# Patient Record
Sex: FEMALE | Race: OTHER | URBAN - METROPOLITAN AREA
[De-identification: names, ages, dates, MRNs, and addresses within clinical notes are randomized per-mention and may not be internally consistent; named-entity substitution may affect disease eponyms.]

---

## 2022-10-08 ENCOUNTER — EMERGENCY (EMERGENCY)
Facility: HOSPITAL | Age: 32
LOS: 0 days | Discharge: HOME | End: 2022-10-08
Attending: STUDENT IN AN ORGANIZED HEALTH CARE EDUCATION/TRAINING PROGRAM

## 2022-10-08 VITALS
RESPIRATION RATE: 16 BRPM | SYSTOLIC BLOOD PRESSURE: 112 MMHG | OXYGEN SATURATION: 96 % | DIASTOLIC BLOOD PRESSURE: 80 MMHG | HEART RATE: 85 BPM | TEMPERATURE: 98 F

## 2022-10-08 VITALS
HEART RATE: 107 BPM | RESPIRATION RATE: 17 BRPM | SYSTOLIC BLOOD PRESSURE: 118 MMHG | WEIGHT: 151.9 LBS | TEMPERATURE: 99 F | OXYGEN SATURATION: 100 % | DIASTOLIC BLOOD PRESSURE: 80 MMHG

## 2022-10-08 DIAGNOSIS — M25.561 PAIN IN RIGHT KNEE: ICD-10-CM

## 2022-10-08 PROCEDURE — 99283 EMERGENCY DEPT VISIT LOW MDM: CPT

## 2022-10-08 PROCEDURE — 73562 X-RAY EXAM OF KNEE 3: CPT | Mod: 26,RT

## 2022-10-08 RX ORDER — ACETAMINOPHEN 500 MG
650 TABLET ORAL ONCE
Refills: 0 | Status: COMPLETED | OUTPATIENT
Start: 2022-10-08 | End: 2022-10-08

## 2022-10-08 NOTE — ED ADULT NURSE NOTE - NSIMPLEMENTINTERV_GEN_ALL_ED
Implemented All Fall Risk Interventions:  [place] to call system. Call bell, personal items and telephone within reach. Instruct patient to call for assistance. Room bathroom lighting operational. Non-slip footwear when patient is off stretcher. Physically safe environment: no spills, clutter or unnecessary equipment. Stretcher in lowest position, wheels locked, appropriate side rails in place. Provide visual cue, wrist band, yellow gown, etc. Monitor gait and stability. Monitor for mental status changes and reorient to person, place, and time. Review medications for side effects contributing to fall risk. Reinforce activity limits and safety measures with patient and family.

## 2022-10-08 NOTE — ED PROVIDER NOTE - WR ORDER ID 1
[id number] Podiatry:  Please follow up with Dr. Shha within 1 week of discharge from the hospital, please call [phone] for appointment and discuss that you recently were seen in the hospital.

## 2022-10-08 NOTE — ED PROVIDER NOTE - PHYSICAL EXAMINATION
VITAL SIGNS: I have reviewed nursing notes and confirm.  CONSTITUTIONAL: well-appearing, non-toxic, NAD  SKIN: Warm dry, normal skin turgor  HEAD: NCAT  EYES: EOMI, PERRLA, no scleral icterus  ENT: Moist mucous membranes, normal pharynx with no erythema or exudates  NECK: Supple; non tender. Full ROM. No cervical LAD  CARD: RRR, no murmurs, rubs or gallops  RESP: clear to ausculation b/l.  No rales, rhonchi, or wheezing.  ABD: soft, + BS, non-tender, non-distended, no rebound or guarding. No CVA tenderness  EXT: R knee tenderness, no noted swelling or redness FROM knee, knee active or passive FROM.   NEURO: normal motor. normal sensory. CN II-XII intact. Cerebellar testing normal. Normal gait.  PSYCH: Cooperative, appropriate.

## 2022-10-08 NOTE — ED PROVIDER NOTE - CLINICAL SUMMARY MEDICAL DECISION MAKING FREE TEXT BOX
Pt with chronic R knee pain x3 days, will obtain R knee X-ray will follow up with orthopedic for pain management.

## 2022-10-08 NOTE — ED PROVIDER NOTE - NS ED ROS FT
Constitutional: See HPI.  Eyes: No visual changes, eye pain or discharge. No Photophobia  ENMT: No hearing changes, pain, discharge or infections. No neck pain or stiffness. No limited ROM  Cardiac: No SOB or edema. No chest pain with exertion.  Respiratory: No cough or respiratory distress. No hemoptysis. No history of asthma or RAD.  GI: No nausea, vomiting, diarrhea or abdominal pain.  : No dysuria, frequency or burning. No Discharge  MS: No myalgia, muscle weakness, joint pain or back pain. +R knee pain  Neuro: No headache or weakness. No LOC.  Skin: No skin rash.  Except as documented in the HPI, all other systems are negative.

## 2022-10-08 NOTE — ED ADULT NURSE NOTE - OBJECTIVE STATEMENT
as per patient " hx of low C/D levels, complains of right knee pain, swelling and decrease mobility x 3 months, symptoms worsening for the past 3 days" [ Denies trauma to knee]

## 2022-10-08 NOTE — ED ADULT TRIAGE NOTE - CHIEF COMPLAINT QUOTE
as per patient c/o worsening R knee pain and swelling over 3 days. States symptoms started start 3 months prior, only started worsening recently.   denies injury or trauma.

## 2022-10-08 NOTE — ED PROVIDER NOTE - CARE PROVIDER_API CALL
Jer Barrett)  Orthopaedic Surgery  [address], [address]  [place] [address]  Phone: [phone]  Fax: [phone]  Follow Up Time: 4-6 Days

## 2022-10-08 NOTE — ED PROVIDER NOTE - PATIENT PORTAL LINK FT
You can access the FollowMyHealth Patient Portal offered by [facility] by registering at the following website: [email or web address]. By joining [employer]’s FollowMyHealth portal, you will also be able to view your health information using other applications (apps) compatible with our system.

## 2022-10-08 NOTE — ED PROVIDER NOTE - OBJECTIVE STATEMENT
31 y/o F with no significant PMHx presents to the ED for R knee pain worsening x3 days. Pt denies any trauma. Pt is able to walk with steady gate. Pt is ambulating. Pt denies f/c. Pt has no other complaints.

## 2022-10-08 NOTE — ED PROVIDER NOTE - NSFOLLOWUPINSTRUCTIONS_ED_ALL_ED_FT

## 2023-07-01 ENCOUNTER — EMERGENCY (EMERGENCY)
Facility: HOSPITAL | Age: 33
LOS: 0 days | Discharge: HOME | End: 2023-07-01
Attending: STUDENT IN AN ORGANIZED HEALTH CARE EDUCATION/TRAINING PROGRAM
Payer: COMMERCIAL

## 2023-07-01 VITALS
HEART RATE: 81 BPM | RESPIRATION RATE: 16 BRPM | OXYGEN SATURATION: 98 % | SYSTOLIC BLOOD PRESSURE: 131 MMHG | WEIGHT: 156.09 LBS | TEMPERATURE: 98 F | HEIGHT: 65 IN | DIASTOLIC BLOOD PRESSURE: 68 MMHG

## 2023-07-01 VITALS
SYSTOLIC BLOOD PRESSURE: 121 MMHG | RESPIRATION RATE: 16 BRPM | DIASTOLIC BLOOD PRESSURE: 67 MMHG | HEART RATE: 88 BPM | OXYGEN SATURATION: 98 % | TEMPERATURE: 99 F

## 2023-07-01 DIAGNOSIS — R30.0 DYSURIA: ICD-10-CM

## 2023-07-01 DIAGNOSIS — R10.9 UNSPECIFIED ABDOMINAL PAIN: ICD-10-CM

## 2023-07-01 DIAGNOSIS — N90.7 VULVAR CYST: ICD-10-CM

## 2023-07-01 LAB
ALBUMIN SERPL ELPH-MCNC: 3.5 G/DL — SIGNIFICANT CHANGE UP (ref 3.3–5)
ALP SERPL-CCNC: 71 U/L — SIGNIFICANT CHANGE UP (ref 40–120)
ALT FLD-CCNC: 22 U/L — SIGNIFICANT CHANGE UP (ref 12–78)
ANION GAP SERPL CALC-SCNC: 0 MMOL/L — LOW (ref 5–17)
APPEARANCE UR: CLEAR — SIGNIFICANT CHANGE UP
AST SERPL-CCNC: 18 U/L — SIGNIFICANT CHANGE UP (ref 15–37)
BASOPHILS # BLD AUTO: 0.07 K/UL — SIGNIFICANT CHANGE UP (ref 0–0.2)
BASOPHILS NFR BLD AUTO: 0.9 % — SIGNIFICANT CHANGE UP (ref 0–2)
BILIRUB SERPL-MCNC: 0.3 MG/DL — SIGNIFICANT CHANGE UP (ref 0.2–1.2)
BILIRUB UR-MCNC: NEGATIVE — SIGNIFICANT CHANGE UP
BUN SERPL-MCNC: 7 MG/DL — SIGNIFICANT CHANGE UP (ref 7–23)
CALCIUM SERPL-MCNC: 9 MG/DL — SIGNIFICANT CHANGE UP (ref 8.5–10.1)
CHLORIDE SERPL-SCNC: 109 MMOL/L — HIGH (ref 96–108)
CO2 SERPL-SCNC: 29 MMOL/L — SIGNIFICANT CHANGE UP (ref 22–31)
COLOR SPEC: YELLOW — SIGNIFICANT CHANGE UP
CREAT SERPL-MCNC: 0.58 MG/DL — SIGNIFICANT CHANGE UP (ref 0.5–1.3)
DIFF PNL FLD: NEGATIVE — SIGNIFICANT CHANGE UP
EGFR: 121 ML/MIN/1.73M2 — SIGNIFICANT CHANGE UP
EOSINOPHIL # BLD AUTO: 0.29 K/UL — SIGNIFICANT CHANGE UP (ref 0–0.5)
EOSINOPHIL NFR BLD AUTO: 3.5 % — SIGNIFICANT CHANGE UP (ref 0–6)
GLUCOSE SERPL-MCNC: 98 MG/DL — SIGNIFICANT CHANGE UP (ref 70–99)
GLUCOSE UR QL: NEGATIVE MG/DL — SIGNIFICANT CHANGE UP
HCG SERPL-ACNC: <1 MIU/ML — SIGNIFICANT CHANGE UP
HCT VFR BLD CALC: 36.1 % — SIGNIFICANT CHANGE UP (ref 34.5–45)
HGB BLD-MCNC: 11.5 G/DL — SIGNIFICANT CHANGE UP (ref 11.5–15.5)
IMM GRANULOCYTES NFR BLD AUTO: 0.5 % — SIGNIFICANT CHANGE UP (ref 0–0.9)
KETONES UR-MCNC: NEGATIVE — SIGNIFICANT CHANGE UP
LEUKOCYTE ESTERASE UR-ACNC: NEGATIVE — SIGNIFICANT CHANGE UP
LYMPHOCYTES # BLD AUTO: 2.07 K/UL — SIGNIFICANT CHANGE UP (ref 1–3.3)
LYMPHOCYTES # BLD AUTO: 25.2 % — SIGNIFICANT CHANGE UP (ref 13–44)
MCHC RBC-ENTMCNC: 26.3 PG — LOW (ref 27–34)
MCHC RBC-ENTMCNC: 31.9 G/DL — LOW (ref 32–36)
MCV RBC AUTO: 82.6 FL — SIGNIFICANT CHANGE UP (ref 80–100)
MONOCYTES # BLD AUTO: 0.52 K/UL — SIGNIFICANT CHANGE UP (ref 0–0.9)
MONOCYTES NFR BLD AUTO: 6.3 % — SIGNIFICANT CHANGE UP (ref 2–14)
NEUTROPHILS # BLD AUTO: 5.22 K/UL — SIGNIFICANT CHANGE UP (ref 1.8–7.4)
NEUTROPHILS NFR BLD AUTO: 63.6 % — SIGNIFICANT CHANGE UP (ref 43–77)
NITRITE UR-MCNC: NEGATIVE — SIGNIFICANT CHANGE UP
NRBC # BLD: 0 /100 WBCS — SIGNIFICANT CHANGE UP (ref 0–0)
PH UR: 7 — SIGNIFICANT CHANGE UP (ref 5–8)
PLATELET # BLD AUTO: 321 K/UL — SIGNIFICANT CHANGE UP (ref 150–400)
POTASSIUM SERPL-MCNC: 3.8 MMOL/L — SIGNIFICANT CHANGE UP (ref 3.5–5.3)
POTASSIUM SERPL-SCNC: 3.8 MMOL/L — SIGNIFICANT CHANGE UP (ref 3.5–5.3)
PROT SERPL-MCNC: 7.3 GM/DL — SIGNIFICANT CHANGE UP (ref 6–8.3)
PROT UR-MCNC: NEGATIVE MG/DL — SIGNIFICANT CHANGE UP
RBC # BLD: 4.37 M/UL — SIGNIFICANT CHANGE UP (ref 3.8–5.2)
RBC # FLD: 13.3 % — SIGNIFICANT CHANGE UP (ref 10.3–14.5)
SODIUM SERPL-SCNC: 138 MMOL/L — SIGNIFICANT CHANGE UP (ref 135–145)
SP GR SPEC: 1 — LOW (ref 1.01–1.02)
TSH SERPL-MCNC: 4.42 UIU/ML — HIGH (ref 0.36–3.74)
UROBILINOGEN FLD QL: NEGATIVE MG/DL — SIGNIFICANT CHANGE UP
WBC # BLD: 8.21 K/UL — SIGNIFICANT CHANGE UP (ref 3.8–10.5)
WBC # FLD AUTO: 8.21 K/UL — SIGNIFICANT CHANGE UP (ref 3.8–10.5)

## 2023-07-01 PROCEDURE — 10060 I&D ABSCESS SIMPLE/SINGLE: CPT

## 2023-07-01 PROCEDURE — 74177 CT ABD & PELVIS W/CONTRAST: CPT | Mod: 26,MC

## 2023-07-01 PROCEDURE — 99285 EMERGENCY DEPT VISIT HI MDM: CPT | Mod: 25

## 2023-07-01 RX ORDER — KETOROLAC TROMETHAMINE 30 MG/ML
15 SYRINGE (ML) INJECTION ONCE
Refills: 0 | Status: DISCONTINUED | OUTPATIENT
Start: 2023-07-01 | End: 2023-07-01

## 2023-07-01 RX ORDER — PSEUDOEPHEDRINE HCL 30 MG
30 TABLET ORAL ONCE
Refills: 0 | Status: COMPLETED | OUTPATIENT
Start: 2023-07-01 | End: 2023-07-01

## 2023-07-01 RX ORDER — LORATADINE 10 MG/1
10 TABLET ORAL ONCE
Refills: 0 | Status: COMPLETED | OUTPATIENT
Start: 2023-07-01 | End: 2023-07-01

## 2023-07-01 RX ADMIN — LORATADINE 10 MILLIGRAM(S): 10 TABLET ORAL at 16:06

## 2023-07-01 RX ADMIN — Medication 30 MILLIGRAM(S): at 20:25

## 2023-07-01 RX ADMIN — Medication 15 MILLIGRAM(S): at 16:08

## 2023-07-01 RX ADMIN — Medication 15 MILLIGRAM(S): at 15:53

## 2023-07-01 NOTE — ED ADULT NURSE NOTE - OBJECTIVE STATEMENT
34 yo female, A&Ox4, Greenlandic speaking.  Hadid ID [id number] helped with communication. Patient reports she noticed a lump on the L labia two weeks ago. She reports had some discharge then, visited her PCP who order lab work and MRI which she reports was normal. Patient states lump seemed bigger as of yesterday and is tender to touch. Mild burning while urinating.  Denies fever, recent illness, abd/flank pain, n/v/d.  but not sexually active. LMP 6/26/23 (3 days). PMH: Thyroid disorder NKDA

## 2023-07-01 NOTE — ED ADULT NURSE NOTE - NSFALLUNIVINTERV_ED_ALL_ED
Bed/Stretcher in lowest position, wheels locked, appropriate side rails in place/Call bell, personal items and telephone in reach/Instruct patient to call for assistance before getting out of bed/chair/stretcher/Non-slip footwear applied when patient is off stretcher/[place] to call system/Physically safe environment - no spills, clutter or unnecessary equipment/Purposeful proactive rounding/Room/bathroom lighting operational, light cord in reach

## 2023-07-01 NOTE — ED PROVIDER NOTE - PHYSICAL EXAMINATION
GEN: Awake, alert, interactive, NAD.  HEAD AND NECK: NC/AT. Airway patent. Neck supple.   EYES:  Clear b/l. EOMI. PERRL.   ENT: Moist mucus membranes.   CARDIAC: Regular rate, regular rhythm. No evident pedal edema.    RESP/CHEST: Normal respiratory effort with no use of accessory muscles or retractions. Clear throughout on auscultation.  ABD: Soft, non-distended, non-tender. No rebound, no guarding.   : Performed w/ chaperone, + tender 1cm mass inferior to L labia w/o surrounding erythema, not c/w Bartholin's cyst / abscess. Pt never sexually active, will defer speculum exam. W/o visible d/c or bleeding from vagina.    BACK: No midline spinal TTP. No CVAT.   EXTREMITIES: Moving all extremities with no apparent deformities.   SKIN: Warm, dry, intact normal color. No rash.   NEURO: AOx3, CN II-XII grossly intact, no focal deficits.   PSYCH: Appropriate mood and affect.

## 2023-07-01 NOTE — ED PROVIDER NOTE - CLINICAL SUMMARY MEDICAL DECISION MAKING FREE TEXT BOX
Otherwise healthy 33F pw 2wks abd pain a/w mild dysuria, painful mass inferior to L labia. Afebrile, VSS. Well appearing, in NAD. Exam as noted in PE. Plan for CBC, CMP, HCG, TSH, UA/C, CT AP +/- US. Give Toradol, Claritin. Re-eval. Otherwise healthy 33F pw 2wks abd pain a/w mild dysuria, painful mass inferior to L labia. Afebrile, VSS. Well appearing, in NAD. Exam as noted in PE. Plan for CBC, CMP, HCG, TSH, UA/C, CT AP +/- US. Give Toradol, Claritin. Re-eval.  W/u significant for: + slight elevation TSH. UA w/o evidence infection. CT AP w/ + physiologic BL ovarian cysts measuring up to 2.8cm. Small (1.3x0.7cm) rim enhancing lesion cystic lesion along L labia, small abscess not excluded. Will perform I&D. I&D w/o purulent d/c, + bloody d/c. Pt tolerated well.   On re-eval, resting comfortably, in NAD. Stable for d/c home. Given / instructed for close outpatient PCP, GYN, Allergist f/u. Recommend daily antiallergy (such as Zyrtec / Allegra / Claritin). Return signs / symptoms d/w pt. She understands / agrees w/ this plan.

## 2023-07-01 NOTE — ED PROVIDER NOTE - PATIENT PORTAL LINK FT
You can access the FollowMyHealth Patient Portal offered by [facility] by registering at the following website: [email or web address]. By joining [employer]’s FollowMyHealth portal, you will also be able to view your health information using other applications (apps) compatible with our system.

## 2023-07-01 NOTE — ED PROVIDER NOTE - OBJECTIVE STATEMENT
33F Punjubi / Arabic speaking w/o significant PMH BIBEMS d/t 2wks of abd pain. Pt reports L labial mass, had recent MR AP done, reportedly negative, pt w/ recent lab work, WNL. Pt reports slight vaginal d/c 2wks ago, since resolved. Pt endorses mild dysuria. Denies F/C, h/a, dizziness, CP, palpitations, SOB, cough, flank pain, N/V/D/C, LE pain / swelling. Pt mentions BL under eye puffiness, believes 2/2 allergies. Pt in states alone, from [place]. Pt is , but never sexually active.     PMH none, PSH none, NKDA, no meds, LMP 6/26. 33F Punjubi / Pashto speaking w/o significant PMH BIBEMS d/t 2wks of abd pain. Pt reports L labial mass, had recent MR AP done, reportedly negative, pt w/ recent lab work, WNL except mild elevation TSH. Pt reports slight vaginal d/c 2wks ago, since resolved. Pt endorses mild dysuria. Denies F/C, h/a, dizziness, CP, palpitations, SOB, cough, flank pain, N/V/D/C, LE pain / swelling. Pt mentions BL under eye puffiness, believes 2/2 allergies. Pt in states alone, from [place]. Pt is , but never sexually active.     PMH none, PSH none, NKDA, no meds, LMP 6/26.

## 2023-07-01 NOTE — ED ADULT TRIAGE NOTE - CHIEF COMPLAINT QUOTE
brought by ems for lower abdominal pain and pain on urination . pt is also have something bulging in the vagina . no past medical history

## 2023-07-01 NOTE — ED PROVIDER NOTE - NSFOLLOWUPCLINICS_GEN_ALL_ED_FT
[facility]  Allergy  [address] [place] [address]  Phone: [phone]  Fax:   Follow Up Time: 7-10 Days    [facility] [place]  Internal Medicine  [facility] [place] [address]  Phone: [phone]  Fax: [phone]  Follow Up Time: 7-10 Days

## 2023-07-01 NOTE — ED PROVIDER NOTE - CARE PROVIDER_API CALL
Dustin Baum  Internal Medicine  [address] [place] [address]  Phone: [phone]  Fax: [phone]  Follow Up Time: 7-10 Days    Kirk Jackson  Obstetrics and Gynecology  [facility], [address]  [place] [address]  Phone: [phone]  Fax: [phone]  Follow Up Time: 7-10 Days

## 2023-07-03 LAB
CULTURE RESULTS: SIGNIFICANT CHANGE UP
SPECIMEN SOURCE: SIGNIFICANT CHANGE UP

## 2024-03-02 ENCOUNTER — EMERGENCY (EMERGENCY)
Facility: HOSPITAL | Age: 34
LOS: 0 days | Discharge: HOME | End: 2024-03-02
Attending: STUDENT IN AN ORGANIZED HEALTH CARE EDUCATION/TRAINING PROGRAM
Payer: COMMERCIAL

## 2024-03-02 VITALS
HEART RATE: 73 BPM | OXYGEN SATURATION: 98 % | TEMPERATURE: 98 F | DIASTOLIC BLOOD PRESSURE: 63 MMHG | RESPIRATION RATE: 18 BRPM | SYSTOLIC BLOOD PRESSURE: 106 MMHG

## 2024-03-02 VITALS
HEART RATE: 94 BPM | TEMPERATURE: 99 F | SYSTOLIC BLOOD PRESSURE: 102 MMHG | OXYGEN SATURATION: 98 % | DIASTOLIC BLOOD PRESSURE: 71 MMHG | RESPIRATION RATE: 18 BRPM

## 2024-03-02 DIAGNOSIS — R53.1 WEAKNESS: ICD-10-CM

## 2024-03-02 DIAGNOSIS — R20.2 PARESTHESIA OF SKIN: ICD-10-CM

## 2024-03-02 DIAGNOSIS — R20.0 ANESTHESIA OF SKIN: ICD-10-CM

## 2024-03-02 LAB
ALBUMIN SERPL ELPH-MCNC: 3.3 G/DL — SIGNIFICANT CHANGE UP (ref 3.3–5)
ALP SERPL-CCNC: 68 U/L — SIGNIFICANT CHANGE UP (ref 40–120)
ALT FLD-CCNC: 25 U/L — SIGNIFICANT CHANGE UP (ref 12–78)
ANION GAP SERPL CALC-SCNC: 5 MMOL/L — SIGNIFICANT CHANGE UP (ref 5–17)
APTT BLD: 35.7 SEC — HIGH (ref 24.5–35.6)
AST SERPL-CCNC: 19 U/L — SIGNIFICANT CHANGE UP (ref 15–37)
BASOPHILS # BLD AUTO: 0.06 K/UL — SIGNIFICANT CHANGE UP (ref 0–0.2)
BASOPHILS NFR BLD AUTO: 1.1 % — SIGNIFICANT CHANGE UP (ref 0–2)
BILIRUB SERPL-MCNC: 0.3 MG/DL — SIGNIFICANT CHANGE UP (ref 0.2–1.2)
BUN SERPL-MCNC: 11 MG/DL — SIGNIFICANT CHANGE UP (ref 7–23)
CALCIUM SERPL-MCNC: 8.6 MG/DL — SIGNIFICANT CHANGE UP (ref 8.5–10.1)
CHLORIDE SERPL-SCNC: 112 MMOL/L — HIGH (ref 96–108)
CO2 SERPL-SCNC: 26 MMOL/L — SIGNIFICANT CHANGE UP (ref 22–31)
CREAT SERPL-MCNC: 0.46 MG/DL — LOW (ref 0.5–1.3)
EGFR: 128 ML/MIN/1.73M2 — SIGNIFICANT CHANGE UP
EOSINOPHIL # BLD AUTO: 0.2 K/UL — SIGNIFICANT CHANGE UP (ref 0–0.5)
EOSINOPHIL NFR BLD AUTO: 3.6 % — SIGNIFICANT CHANGE UP (ref 0–6)
GLUCOSE SERPL-MCNC: 95 MG/DL — SIGNIFICANT CHANGE UP (ref 70–99)
HCG SERPL-ACNC: <1 MIU/ML — SIGNIFICANT CHANGE UP
HCT VFR BLD CALC: 33 % — LOW (ref 34.5–45)
HGB BLD-MCNC: 10.4 G/DL — LOW (ref 11.5–15.5)
IMM GRANULOCYTES NFR BLD AUTO: 0.2 % — SIGNIFICANT CHANGE UP (ref 0–0.9)
INR BLD: 1.06 RATIO — SIGNIFICANT CHANGE UP (ref 0.85–1.18)
LYMPHOCYTES # BLD AUTO: 1.48 K/UL — SIGNIFICANT CHANGE UP (ref 1–3.3)
LYMPHOCYTES # BLD AUTO: 26.8 % — SIGNIFICANT CHANGE UP (ref 13–44)
MCHC RBC-ENTMCNC: 25.4 PG — LOW (ref 27–34)
MCHC RBC-ENTMCNC: 31.5 G/DL — LOW (ref 32–36)
MCV RBC AUTO: 80.5 FL — SIGNIFICANT CHANGE UP (ref 80–100)
MONOCYTES # BLD AUTO: 0.37 K/UL — SIGNIFICANT CHANGE UP (ref 0–0.9)
MONOCYTES NFR BLD AUTO: 6.7 % — SIGNIFICANT CHANGE UP (ref 2–14)
NEUTROPHILS # BLD AUTO: 3.4 K/UL — SIGNIFICANT CHANGE UP (ref 1.8–7.4)
NEUTROPHILS NFR BLD AUTO: 61.6 % — SIGNIFICANT CHANGE UP (ref 43–77)
NRBC # BLD: 0 /100 WBCS — SIGNIFICANT CHANGE UP (ref 0–0)
PLATELET # BLD AUTO: 292 K/UL — SIGNIFICANT CHANGE UP (ref 150–400)
POTASSIUM SERPL-MCNC: 3.6 MMOL/L — SIGNIFICANT CHANGE UP (ref 3.5–5.3)
POTASSIUM SERPL-SCNC: 3.6 MMOL/L — SIGNIFICANT CHANGE UP (ref 3.5–5.3)
PROT SERPL-MCNC: 6.7 GM/DL — SIGNIFICANT CHANGE UP (ref 6–8.3)
PROTHROM AB SERPL-ACNC: 12.6 SEC — SIGNIFICANT CHANGE UP (ref 9.5–13)
RBC # BLD: 4.1 M/UL — SIGNIFICANT CHANGE UP (ref 3.8–5.2)
RBC # FLD: 14 % — SIGNIFICANT CHANGE UP (ref 10.3–14.5)
SODIUM SERPL-SCNC: 143 MMOL/L — SIGNIFICANT CHANGE UP (ref 135–145)
WBC # BLD: 5.52 K/UL — SIGNIFICANT CHANGE UP (ref 3.8–10.5)
WBC # FLD AUTO: 5.52 K/UL — SIGNIFICANT CHANGE UP (ref 3.8–10.5)

## 2024-03-02 PROCEDURE — 93010 ELECTROCARDIOGRAM REPORT: CPT

## 2024-03-02 PROCEDURE — 70450 CT HEAD/BRAIN W/O DYE: CPT | Mod: 26,MC,59

## 2024-03-02 PROCEDURE — 70498 CT ANGIOGRAPHY NECK: CPT | Mod: 26,MC

## 2024-03-02 PROCEDURE — 99285 EMERGENCY DEPT VISIT HI MDM: CPT

## 2024-03-02 PROCEDURE — 70496 CT ANGIOGRAPHY HEAD: CPT | Mod: 26,MC

## 2024-03-02 NOTE — ED PROVIDER NOTE - PROGRESS NOTE DETAILS
ERIKA Koo: Workup reviewed. Results endorsed including unexpected incidental findings (copy of reports provided to patient). Shared Decision Making - Reassessment performed. Patient is medically stable for discharge. Strict return precautions given, discussed red flag signs/symptoms. Patient to follow up with PMD. Patient/parent displays understanding and agreeable with plan, comfortable with discharge plan home. Plan for discharge discussed with  who agrees with disposition and discharge plan. ERIKA Koo: Workup reviewed - Labs WNL/not actionable at this time, CTA Brain/Neck w/ No retrievable intracranial clot or hemodynamically significant stenoses in the neck. Incidental presumed left PCOM infundibulum. No acute intracranial hemorrhage. Results endorsed including unexpected incidental findings (copy of reports provided to patient). Shared Decision Making - Reassessment performed, pt reports improvement of symptoms since this AM, pt w/ full strength in ED prior to DC home, discussed follow-up with Neurosurgery for worsening/persistent symptoms. Patient is medically stable for discharge. Strict return precautions given, discussed red flag signs/symptoms. Patient to follow up with PMD, Neurosurg. Patient/parent displays understanding and agreeable with plan, comfortable with discharge plan home. Plan for discharge discussed with Dr. Dunham who agrees with disposition and discharge plan. Utilized [employer]  Bishnu ID #[id number] for discharge instructions.

## 2024-03-02 NOTE — ED PROVIDER NOTE - PHYSICAL EXAMINATION
General appearance: Nontoxic appearing, conversant, afebrile    Eyes: anicteric sclerae, MARY, EOMI   HENT: Atraumatic; oropharynx clear, MMM and no ulcerations, no pharyngeal erythema or exudate   Neck: Trachea midline; Full range of motion, supple, no midline ttp   Pulm: CTA bl, normal respiratory effort and no intercostal retractions, normal work of breathing   CV: RRR, No murmurs, rubs, or gallops   Extremities: No peripheral edema, no gross deformities, FROM x4   Skin: Dry, normal temperature, turgor and texture; no rash   Psych: Appropriate affect, cooperative    Neuro: CN2-12 grossly intact, speech coherent, , 5/5 MS x4, gross sensation intact except some diminished sensation median n distribution of L hand, peripheral pulses intact, finger to nose smooth and rapid, negative pronator drift

## 2024-03-02 NOTE — ED ADULT TRIAGE NOTE - CHIEF COMPLAINT QUOTE
Pt AAO x 3 ambulatory with steady gait c/o left arm weakness numbness un able to move  x 9 am s/p waking up went to her PCP , who referred her here for TIA evaluation. pt reports decreased appetite with nausea x 1 week headache to both temples going to her eyes x 3 days , no drift no droop, no weakness noted at present time LKWT 0200 when she went to sleep LMP 2/2/24

## 2024-03-02 NOTE — ED PROVIDER NOTE - PATIENT PORTAL LINK FT
You can access the FollowMyHealth Patient Portal offered by [facility] by registering at the following website: [email or web address]. By joining [employer]’s FollowMyHealth portal, you will also be able to view your health information using other applications (apps) compatible with our system.

## 2024-03-02 NOTE — ED PROVIDER NOTE - NSFOLLOWUPINSTRUCTIONS_ED_ALL_ED_FT
- ???? ????/????? ??????/?? ??? ?? ??? ???? ???? ?? ???? ????? ????? ?? ???? ???? ?? ?????  - ???? ???? ?? ???? ???? ??????? ???? ????? ?? ???? ?? ?????    ?????? ?? ????? ????? ??????? ????? ?? ????? ???? ???? ????? ???? ?????? ???? ????? ?? ?? ?? ????? ???? ??? ????? ?? ?? ??? ??? 48-72 ?????? ??? ???? ?????? ??????? ?? ????? ?? ???? ???? ?? ????- ???? ????? ?? ?????? ?????? ????? ???? ?? ????? ??????? ???/?? ??? ??? ??? ?? ?????? ?????? ???? ????? ???? ??? ???? ???? ?? ???? ??? ??? ???? ??????/?? ???/ ?????? ?? ?????? ?? ?????????? ???? ??? ???/??????? ????? ????/????? ?? ??? ER ?? ???? ?????? ????? ???? ??? ??? ??????? ?? ?? ???. ??? ?? ?? ???? ?? ???? ???? ??? ????? ????? ???? ?? ???? ??? ??? ????: [phone] ([id number] ???? ????? ??? ?? ?? ??????? ???? ???? ????? ?? ???? ?? ???? ???? ?? ???? ?? ?????? ?????? ????? ?? ?????? ?? ??? [phone] ?? ??? ?? ???? ???? - Follow-up with Neurosurgery within the next week for worsening/persistent weakness/numbness.  - Follow-up with your Primary Care Physician within the next week.    Advance activity as tolerated.  Continue all previously prescribed medications as directed unless otherwise instructed.  Follow up with your primary care physician in 48-72 hours- bring copies of your results.  Return to the ER for worsening or persistent symptoms, and/or ANY NEW OR CONCERNING SYMPTOMS such as fever, chest pain, shortness of breath, abdominal pain, weakness/numbness/tingling of arms or legs, neck pain/stiffness, fainting/passing out, vision changes, or headaches. If you have issues obtaining follow up, please call: [phone] to obtain a doctor or specialist who takes your insurance in your area.  You may call [phone] to make an appointment with the internal medicine clinic.    Weakness is a lack of strength. You may feel weak all over your body (generalized), or you may feel weak in one specific part of your body (focal). Common causes of weakness include:    Infection and immune system disorders.  Physical exhaustion.  Internal bleeding or other blood loss that results in a lack of red blood cells (anemia).  Dehydration.  An imbalance in mineral (electrolyte) levels, such as potassium.  Heart disease, circulation problems, or stroke.    Other causes include:    Some medicines or cancer treatment.  Stress, anxiety, or depression.  Nervous system disorders.  Thyroid disorders.  Loss of muscle strength because of age or inactivity.  Poor sleep quality or sleep disorders.    The cause of your weakness may not be known. Some causes of weakness can be serious, so it is important to see your health care provider.    Follow these instructions at home:    Activity    Rest as needed.  Try to get enough sleep. Most adults need 7–8 hours of quality sleep each night. Talk to your health care provider about how much sleep you need each night.  Do exercises, such as arm curls and leg raises, for 30 minutes at least 2 days a week or as told by your health care provider. This helps build muscle strength.  Consider working with a physical therapist or  who can develop an exercise plan to help you gain muscle strength.    General instructions     Take over-the-counter and prescription medicines only as told by your health care provider.  Eat a healthy, well-balanced diet. This includes:    Proteins to build muscles, such as lean meats and fish.  Fresh fruits and vegetables.  Carbohydrates to boost energy, such as whole grains.  Drink enough fluid to keep your urine pale yellow.  Keep all follow-up visits as told by your health care provider. This is important.    Contact a health care provider if your weakness:  Does not improve or gets worse.  Affects your ability to think clearly.  Affects your ability to do your normal daily activities.    Get help right away if you:  Develop sudden weakness, especially on one side of your face or body.  Have chest pain.  Have trouble breathing or shortness of breath.  Have problems with your vision.  Have trouble talking or swallowing.  Have trouble standing or walking.  Are light-headed or lose consciousness.    Summary  Weakness is a lack of strength. You may feel weak all over your body or just in one specific part of your body.  Weakness can be caused by a variety of things. In some cases, the cause may be unknown.  Rest as needed, and try to get enough sleep. Most adults need 7–8 hours of quality sleep each night.  Eat a healthy, well-balanced diet.    ADDITIONAL NOTES AND INSTRUCTIONS    Please follow up with your Primary MD in 24-48 hr.  Seek immediate medical care for any new/worsening signs or symptoms.

## 2024-03-02 NOTE — ED ADULT NURSE NOTE - NSFALLUNIVINTERV_ED_ALL_ED
Bed/Stretcher in lowest position, wheels locked, appropriate side rails in place/Call bell, personal items and telephone in reach/Instruct patient to call for assistance before getting out of bed/chair/stretcher/Non-slip footwear applied when patient is off stretcher/[place] to call system/Physically safe environment - no spills, clutter or unnecessary equipment/Purposeful proactive rounding/Room/bathroom lighting operational, light cord in reach

## 2024-03-02 NOTE — ED PROVIDER NOTE - CLINICAL SUMMARY MEDICAL DECISION MAKING FREE TEXT BOX
34yo female with no pmh presenting with LUE weakness, numbness.  She woke up this morning and felt these symptoms.  She shook out her arm and within 1 minute symptomatically much improved.  Currently only feels slight L hand paresthesias.  Notes she has been having headaches for about 1 week.  No other issues with numbness, weakness, cp, sob, visual changes.  Spoke with her doctor who directed her to ED for TIA evaluation.  Presentation seems more c/w peripheral neuropathy, possibly sleeping on it causing symptoms.  With no risk factors and young age, pretest probability of cva/ tia at this time much lower.  Plan for screening labs, imaging, reassess. 34yo female with no pmh presenting with LUE weakness, numbness.  She woke up this morning and felt these symptoms.  She shook out her arm and within 1 minute symptomatically much improved.  Currently only feels slight L hand paresthesias.  Notes she has been having headaches for about 1 week.  No other issues with numbness, weakness, cp, sob, visual changes.  Spoke with her doctor who directed her to ED for TIA evaluation.  Presentation seems more c/w peripheral neuropathy, possibly sleeping on it causing symptoms.  With no risk factors and young age, pretest probability of cva/ tia at this time much lower.  Plan for screening labs, imaging, reassess. Utilized [employer]  Sweet ID # [id number].

## 2024-03-02 NOTE — ED ADULT NURSE NOTE - OBJECTIVE STATEMENT
Pt c/o left arm weakness with numbness pt able to move arm but stated she has to wake up the arm. PCP sent pt in to the ED for TIA evaluation. last known well time @ 0200 3-2-24. pt c/o decreased appetite with nausea x 1 week and headache. No drift or droop noted to the arm. no weakness noted at present time.   no PMH noted

## 2024-04-05 NOTE — ED PROVIDER NOTE - PROVIDER TOKENS
Imaging Studies PROVIDER:[TOKEN:[[id number]],FOLLOWUP:[7-10 Days]],PROVIDER:[TOKEN:[[id number]],FOLLOWUP:[7-10 Days]] independent

## 2024-05-01 NOTE — ED PROVIDER NOTE - CROS ED ROS STATEMENT
Hpi Title: Evaluation of Skin Lesions
Additional History: Patient has a list of concern’s today wouldn’t specify to me.
all other ROS negative except as per HPI

## 2024-09-15 ENCOUNTER — EMERGENCY (EMERGENCY)
Facility: HOSPITAL | Age: 34
LOS: 1 days | Discharge: HOME | End: 2024-09-15
Attending: EMERGENCY MEDICINE | Admitting: EMERGENCY MEDICINE
Payer: COMMERCIAL

## 2024-09-15 VITALS
TEMPERATURE: 98 F | HEART RATE: 85 BPM | SYSTOLIC BLOOD PRESSURE: 122 MMHG | OXYGEN SATURATION: 98 % | WEIGHT: 143.3 LBS | RESPIRATION RATE: 18 BRPM | DIASTOLIC BLOOD PRESSURE: 88 MMHG | HEIGHT: 64 IN

## 2024-09-15 VITALS
SYSTOLIC BLOOD PRESSURE: 107 MMHG | HEART RATE: 62 BPM | RESPIRATION RATE: 18 BRPM | TEMPERATURE: 99 F | DIASTOLIC BLOOD PRESSURE: 65 MMHG | OXYGEN SATURATION: 98 %

## 2024-09-15 PROCEDURE — 99284 EMERGENCY DEPT VISIT MOD MDM: CPT

## 2024-09-15 PROCEDURE — 93010 ELECTROCARDIOGRAM REPORT: CPT

## 2024-09-15 PROCEDURE — 73070 X-RAY EXAM OF ELBOW: CPT | Mod: 26,RT

## 2024-09-15 RX ORDER — ACETAMINOPHEN 325 MG/1
2 TABLET ORAL
Qty: 100 | Refills: 0
Start: 2024-09-15

## 2024-09-15 RX ORDER — ACETAMINOPHEN WITH CODEINE 300MG-30MG
1 TABLET ORAL
Qty: 50 | Refills: 0
Start: 2024-09-15

## 2024-09-15 RX ORDER — ACETAMINOPHEN 325 MG/1
975 TABLET ORAL ONCE
Refills: 0 | Status: COMPLETED | OUTPATIENT
Start: 2024-09-15 | End: 2024-09-15

## 2024-09-15 RX ORDER — IBUPROFEN 600 MG
400 TABLET ORAL ONCE
Refills: 0 | Status: COMPLETED | OUTPATIENT
Start: 2024-09-15 | End: 2024-09-15

## 2024-09-15 RX ADMIN — Medication 400 MILLIGRAM(S): at 12:09

## 2024-09-15 RX ADMIN — ACETAMINOPHEN 975 MILLIGRAM(S): 325 TABLET ORAL at 12:09

## 2024-09-15 NOTE — ED ADULT NURSE NOTE - OBJECTIVE STATEMENT
Patient came in with the complaints of right arm and right elbow pain. Patient denies any trauma/injury/chest pain. No other complaints. No distress noted. Medication given as ordered. Patient tolerated well. Nursing care continues

## 2024-09-15 NOTE — ED PROVIDER NOTE - PHYSICAL EXAMINATION
Well-appearing, well nourished, awake, alert, oriented to person, place, time/situation and in no apparent distress.    Airway patent. Neck supple.    Eyes without scleral injection. No jaundice.    Strong pulse.    Respirations unlabored.    Abdomen soft, non-tender, no guarding.    MSK: FROM. NVI. Tender at radial aspect of elbow (hard to tell whether epicondyle or radial head), but not red/hot/swollen. R index finger FROM.    Alert and oriented, no gross motor or sensory deficits.    Skin: normal color for race, warm, dry and intact. No evidence of rash.    No SI/HI.

## 2024-09-15 NOTE — ED PROVIDER NOTE - PATIENT PORTAL LINK FT
You can access the FollowMyHealth Patient Portal offered by [facility] by registering at the following website: [email or web address]. By joining [employer]’s FollowMyHealth portal, you will also be able to view your health information using other applications (apps) compatible with our system.

## 2024-09-15 NOTE — ED PROVIDER NOTE - OBJECTIVE STATEMENT
Jonas: Pain R arm mostly concentrated in radial side of elbow. No direct trauma. No F. Worse when working at home doing cleaning and cooking and grabbing items. No relevant PMH, PSH, or FHx. No significant T/E/D. No allergies. Pain is also sometimes in R index finger.

## 2024-09-15 NOTE — ED PROVIDER NOTE - CLINICAL SUMMARY MEDICAL DECISION MAKING FREE TEXT BOX
Jonas: Likely tennis elbow (Lateral Epicondylitis) from repetitive trauma. May also be radiculopathy, but concentration of pain at lateral epicondyle makes tennis elbow more likely. X-ray. Pain meds. Refer to Ortho.

## 2024-09-15 NOTE — ED PROVIDER NOTE - NSFOLLOWUPINSTRUCTIONS_ED_ALL_ED_FT
Over-the-counter Tylenol 500 mg (1 or 2 every 6 hours) and/or Naproxen 500 mg (1 every 12 hours) for pain control.     Use a neoprene elbow band from any pharmacy, when possible.    You will receive a call from our Care Coordinators to arrange an appointment. with Orthopedics.

## 2024-09-15 NOTE — ED ADULT TRIAGE NOTE - CHIEF COMPLAINT QUOTE
pt c/o of rt arm rt elbow pain for 2 month , pt denies any chest pain denies trauma, limited ROM to rt arm due to pain

## 2024-09-18 PROBLEM — Z78.9 OTHER SPECIFIED HEALTH STATUS: Chronic | Status: ACTIVE | Noted: 2024-03-02

## 2024-09-20 PROBLEM — Z00.00 ENCOUNTER FOR PREVENTIVE HEALTH EXAMINATION: Status: ACTIVE | Noted: 2024-09-20

## 2024-09-23 ENCOUNTER — APPOINTMENT (OUTPATIENT)
Dept: ORTHOPEDIC SURGERY | Facility: CLINIC | Age: 34
End: 2024-09-23

## 2025-03-02 ENCOUNTER — EMERGENCY (EMERGENCY)
Facility: HOSPITAL | Age: 35
LOS: 1 days | Discharge: HOME | End: 2025-03-02
Attending: EMERGENCY MEDICINE | Admitting: EMERGENCY MEDICINE
Payer: COMMERCIAL

## 2025-03-02 VITALS
TEMPERATURE: 99 F | HEIGHT: 64 IN | HEART RATE: 110 BPM | DIASTOLIC BLOOD PRESSURE: 85 MMHG | RESPIRATION RATE: 16 BRPM | SYSTOLIC BLOOD PRESSURE: 123 MMHG | OXYGEN SATURATION: 96 % | WEIGHT: 149.91 LBS

## 2025-03-02 VITALS
HEART RATE: 116 BPM | SYSTOLIC BLOOD PRESSURE: 107 MMHG | RESPIRATION RATE: 16 BRPM | DIASTOLIC BLOOD PRESSURE: 63 MMHG | TEMPERATURE: 98 F | OXYGEN SATURATION: 100 %

## 2025-03-02 LAB
FLUAV AG NPH QL: SIGNIFICANT CHANGE UP
FLUBV AG NPH QL: DETECTED
RSV RNA NPH QL NAA+NON-PROBE: SIGNIFICANT CHANGE UP
S PYO DNA THROAT QL NAA+PROBE: SIGNIFICANT CHANGE UP
SARS-COV-2 RNA SPEC QL NAA+PROBE: SIGNIFICANT CHANGE UP

## 2025-03-02 PROCEDURE — 99284 EMERGENCY DEPT VISIT MOD MDM: CPT

## 2025-03-02 PROCEDURE — 71046 X-RAY EXAM CHEST 2 VIEWS: CPT | Mod: 26

## 2025-03-02 RX ORDER — BENZONATATE 100 MG
100 CAPSULE ORAL ONCE
Refills: 0 | Status: COMPLETED | OUTPATIENT
Start: 2025-03-02 | End: 2025-03-02

## 2025-03-02 RX ORDER — ACETAMINOPHEN 500 MG/5ML
975 LIQUID (ML) ORAL ONCE
Refills: 0 | Status: COMPLETED | OUTPATIENT
Start: 2025-03-02 | End: 2025-03-02

## 2025-03-02 RX ORDER — BENZONATATE 100 MG
1 CAPSULE ORAL
Qty: 6 | Refills: 0
Start: 2025-03-02 | End: 2025-03-04

## 2025-03-02 RX ADMIN — Medication 100 MILLIGRAM(S): at 07:33

## 2025-03-02 RX ADMIN — Medication 975 MILLIGRAM(S): at 07:33

## 2025-03-02 RX ADMIN — Medication 975 MILLIGRAM(S): at 09:16

## 2025-03-02 RX ADMIN — Medication 100 MILLIGRAM(S): at 09:16

## 2025-03-02 NOTE — ED ADULT TRIAGE NOTE - PRO INTERPRETER NEED 2
Phone call to patient, patient voiced understanding of provider's comment and will continue Augmentin. Patient had no further questions.    Lala

## 2025-03-02 NOTE — ED PROVIDER NOTE - CARE PLAN
10/30/2023    Daisha Crawford   [address]  [place] [address]    At a recent visit to the mammography department at [facility], you were screened by one of our mammogram technologists. This screening process looks at family and personal health information to see if you are at an increased risk of getting breast cancer and/or will qualify for genetic testing (based on family history of breast, pancreatic, or ovarian cancer). . The screening tools assign you a score that places you in either a high, moderate or average risk category for breast cancer, but does not mean that you have or will get breast cancer.  The category score provides an opportunity for additional discussions regarding your breast health.  For a moderate risk category score, it may be good for you to see our Breast Team at [facility].     The results show you are at a Moderate Risk of developing breast cancer.  Your lifetime risk of getting breast cancer is 15.8%  Your Leona Model Risk of getting breast cancer in the next 5 years is 1.3%    The American Cancer Society states 1 in 8 women will develop breast cancer by age 90. This is a 12% risk of developing breast cancer, which is the risk for an average woman. Nationally, a woman with a lifetime risk of 20% or higher is felt to be at high risk of getting breast cancer.     The National Comprehensive Cancer Network (NCCN) suggests that women with Leona Model 5-year risk of 1.7-2.9% consider medication to lower their risk. For women with 5-year risk of 3% or higher, it recommends medication to lower risk.    Screening Recommendations:  [facility] recommends that all women begin screening mammograms at age 40 and have regularly scheduled mammograms.  Breast exams performed by a health care provider are also recommended, as well as occasional self-exam.    If a family member has been diagnosed  with breast cancer, you should start breast cancer screening when you’re ten years younger than the age at which your family member was diagnosed (but not earlier than 30 years old). For example, if a family member was diagnosed at age 45, you should start screening at age 35.      Ways to Lower Your Risk of Breast Cancer:  Studies show there are several lifestyle choices that may lower your risk.     Weight: get to and maintain a healthy weight.   Alcohol: limit alcohol to 1 drink per day or less.   Exercise: get your heart rate up for 30 minutes, 3-5 times a week.  Diet: eat a higher fiber diet.  Medication: taking an Estrogen blocker can cut your risk in half.     A Breast Specialist would be happy to meet with you to discuss your personal situation, which includes what this risk means, answer any screening questions, and chat about ways to lower your risk. A comprehensive breast exam may be completed by a health care provider.      If at any time you wish an appointment at the Breast Center to discuss any of these concerns, please call the high-risk breast  at [phone]      Nina Jordan LPN  High-Risk Breast   [facility]  [address] [address] [phone]     Principal Discharge DX:	URI with cough and congestion   1

## 2025-03-02 NOTE — ED ADULT TRIAGE NOTE - CHIEF COMPLAINT QUOTE
Pt c/o bilateral ear pain, sore throat, head congestion with runny nose x3 days. Denies chest pain, SOB, sick contacts and medical history.

## 2025-03-02 NOTE — ED PROVIDER NOTE - ATTENDING CONTRIBUTION TO CARE
34-year-old female with no reported past medical history that is presenting to the ED with nasal congestion and cough for the last 3 days.  Patient states that she has dry cough and nasal congestion with bilateral eye pain and sore throat she has been taking Tylenol with symptom improvement but transiently.  Does continue so she came to the ED for evaluation.  Denies any fevers, nausea, vomiting, diarrhea shortness of breath, foreign travel no sick contacts.  Denies any urinary symptoms otherwise.  LMP ended 2 days ago does not think she is pregnant    Nasal congestion oropharynx appears normal uvula is midline tongue is flat no erythema in the oropharynx otherwise.  Heart is regular rate and rhythm lungs are clear to auscultation gait is steady stable no gross musculoskeletal deformities skin is otherwise intact    34-year-old female no reported past medical history here for what appears to be viral infection x 3 days but no fevers subjectively at home but is febrile here. Last tylenol was > 24 hours prior to arrival.  clinically patient appears congested but lungs are clear is not coughing in the ED able to speak full sentences oropharynx appears normal.  At this time explained to patient via  that viral infections which is what she likely has treated symptomatically which she is aware.  She states that she cannot get medications at the pharmacy because they are closed today and would like to have medications given to her to the in the ED today which we will do but one-time dose and that she needs to follow-up outpatient and pharmacy with prescribed medications for symptomatic care.  Vital signs noted to be febrile and tachycardic but patient appears well will provide medications obtain flu COVID swab, chest x-ray and reassess. Divehi [facility] # [id number] Bishnu  34-year-old female with no reported past medical history that is presenting to the ED with nasal congestion and cough for the last 3 days.  Patient states that she has dry cough and nasal congestion with bilateral eye pain and sore throat she has been taking Tylenol with symptom improvement but transiently.  Does continue so she came to the ED for evaluation.  Denies any fevers, nausea, vomiting, diarrhea shortness of breath, foreign travel no sick contacts.  Denies any urinary symptoms otherwise.  LMP ended 2 days ago does not think she is pregnant    Nasal congestion oropharynx appears normal uvula is midline tongue is flat no erythema in the oropharynx otherwise.  Heart is regular rate and rhythm lungs are clear to auscultation gait is steady stable no gross musculoskeletal deformities skin is otherwise intact    34-year-old female no reported past medical history here for what appears to be viral infection x 3 days but no fevers subjectively at home but is febrile here. Last tylenol was > 24 hours prior to arrival.  clinically patient appears congested but lungs are clear is not coughing in the ED able to speak full sentences oropharynx appears normal.  At this time explained to patient via  that viral infections which is what she likely has treated symptomatically which she is aware.  She states that she cannot get medications at the pharmacy because they are closed today and would like to have medications given to her to the in the ED today which we will do but one-time dose and that she needs to follow-up outpatient and pharmacy with prescribed medications for symptomatic care.  Vital signs noted to be febrile and tachycardic but patient appears well will provide medications obtain flu COVID swab, chest x-ray and reassess.

## 2025-03-02 NOTE — ED PROVIDER NOTE - PATIENT PORTAL LINK FT
You can access the FollowMyHealth Patient Portal offered by [facility] by registering at the following website: [email or web address]. By joining [employer]’s FollowMyHealth portal, you will also be able to view your health information using other applications (apps) compatible with our system.

## 2025-03-02 NOTE — ED PROVIDER NOTE - PHYSICAL EXAMINATION
Const: Awake, alert, no acute distress.  Well appearing.  Moving comfortably on stretcher.  HEENT: NC/AT.  Moist mucous membranes.  No pharyngeal erythema, no exudates.  Eyes: Extraocular movements intact b/l.  Conjunctiva pink.    Cardiac: Regular rate and regular rhythm. S1 S2 present.  Peripheral pulses 2+ and symmetric.   Resp: Speaking in full sentences. No evidence of respiratory distress.  Breath sounds clear to auscultation b/l. Normal chest excursion.   Abd: Non-distended, no overlying skin changes.  Soft, non-tender, no guarding, no rigidity, no rebound tenderness.  No palpable masses.  Normal bowel sounds in all 4 quadrants.  Skin: Normal coloration.  No rashes, abrasions or lacerations.  Neuro: Awake, alert & oriented x 3.  Moves all extremities spontaneously.  No focal deficits.

## 2025-03-02 NOTE — ED PROVIDER NOTE - CLINICAL SUMMARY MEDICAL DECISION MAKING FREE TEXT BOX
34-year-old female with no significant past medical history presenting to the emergency department with nasal congestion and cough for the past 3 days.  Patient also endorses bilateral eye pain and sore throat.  States she has been taking Tylenol that provided symptom improvement yesterday, however symptoms have returned.  Patient denies any fevers, nausea, vomiting, shortness of breath, difficulty breathing.  Patient endorses posttussive chest pain.  DDx includes viral URI.  Dispo pending labs, imaging and reassessment.

## 2025-03-02 NOTE — ED PROVIDER NOTE - NSFOLLOWUPINSTRUCTIONS_ED_ALL_ED_FT
It was a pleasure caring for you today!    You were seen in the ER today for cough and nasal congestion.     Please follow up with your primary care doctor within 1 - 3 days. Call and let them know you were seen in the ER today.   Bring the results of your blood work and imaging with you to your appointment, if applicable.    For pain, please take acetaminophen 650 mg every 6 hours for pain. Additionally, you can also take ibuprofen 400 mg every 6-8 hours for pain.    Return to the ER for any worsening symptoms or concerns, including chest pain, shortness of breath, lightheadedness, weakness, or any other concerns.

## 2025-03-02 NOTE — ED ADULT NURSE NOTE - OBJECTIVE STATEMENT
Pt received, alert and oriented x4, able to move all extremities and follow commands. C/O throat pain, ear pain, cough and fever. Denies nausea, vomiting and diarrhea. Breathing is even and unlabored on room air. Non-productive cough noted. Rectal temp done, 102.3F. NAD. Safety maintained with B/L side rails up. Pending dispo.
